# Patient Record
Sex: MALE | Race: WHITE | NOT HISPANIC OR LATINO | Employment: OTHER | URBAN - METROPOLITAN AREA
[De-identification: names, ages, dates, MRNs, and addresses within clinical notes are randomized per-mention and may not be internally consistent; named-entity substitution may affect disease eponyms.]

---

## 2017-01-07 ENCOUNTER — ALLSCRIPTS OFFICE VISIT (OUTPATIENT)
Dept: OTHER | Facility: OTHER | Age: 57
End: 2017-01-07

## 2017-01-11 ENCOUNTER — GENERIC CONVERSION - ENCOUNTER (OUTPATIENT)
Dept: OTHER | Facility: OTHER | Age: 57
End: 2017-01-11

## 2017-02-06 ENCOUNTER — ALLSCRIPTS OFFICE VISIT (OUTPATIENT)
Dept: OTHER | Facility: OTHER | Age: 57
End: 2017-02-06

## 2017-02-06 ENCOUNTER — GENERIC CONVERSION - ENCOUNTER (OUTPATIENT)
Dept: OTHER | Facility: OTHER | Age: 57
End: 2017-02-06

## 2017-08-03 ENCOUNTER — GENERIC CONVERSION - ENCOUNTER (OUTPATIENT)
Dept: OTHER | Facility: OTHER | Age: 57
End: 2017-08-03

## 2017-08-04 ENCOUNTER — GENERIC CONVERSION - ENCOUNTER (OUTPATIENT)
Dept: OTHER | Facility: OTHER | Age: 57
End: 2017-08-04

## 2017-08-07 ENCOUNTER — ALLSCRIPTS OFFICE VISIT (OUTPATIENT)
Dept: OTHER | Facility: OTHER | Age: 57
End: 2017-08-07

## 2017-08-15 ENCOUNTER — GENERIC CONVERSION - ENCOUNTER (OUTPATIENT)
Dept: OTHER | Facility: OTHER | Age: 57
End: 2017-08-15

## 2017-11-22 ENCOUNTER — HOSPITAL ENCOUNTER (OUTPATIENT)
Dept: NON INVASIVE DIAGNOSTICS | Facility: CLINIC | Age: 57
Discharge: HOME | End: 2017-11-22
Payer: COMMERCIAL

## 2017-11-22 DIAGNOSIS — I65.29 OCCLUSION AND STENOSIS OF UNSPECIFIED CAROTID ARTERY: ICD-10-CM

## 2017-11-22 PROCEDURE — 93880 EXTRACRANIAL BILAT STUDY: CPT

## 2017-12-12 ENCOUNTER — GENERIC CONVERSION - ENCOUNTER (OUTPATIENT)
Dept: OTHER | Facility: OTHER | Age: 57
End: 2017-12-12

## 2018-01-10 ENCOUNTER — ALLSCRIPTS OFFICE VISIT (OUTPATIENT)
Dept: OTHER | Facility: OTHER | Age: 58
End: 2018-01-10

## 2018-01-10 DIAGNOSIS — I65.23 OCCLUSION AND STENOSIS OF BILATERAL CAROTID ARTERIES: ICD-10-CM

## 2018-01-10 DIAGNOSIS — I10 ESSENTIAL (PRIMARY) HYPERTENSION: ICD-10-CM

## 2018-01-10 DIAGNOSIS — E11.65 TYPE 2 DIABETES MELLITUS WITH HYPERGLYCEMIA (HCC): ICD-10-CM

## 2018-01-10 DIAGNOSIS — Z12.5 ENCOUNTER FOR SCREENING FOR MALIGNANT NEOPLASM OF PROSTATE: ICD-10-CM

## 2018-01-10 DIAGNOSIS — E78.5 HYPERLIPIDEMIA: ICD-10-CM

## 2018-01-10 DIAGNOSIS — E78.00 PURE HYPERCHOLESTEROLEMIA: ICD-10-CM

## 2018-01-11 NOTE — RESULT NOTES
Verified Results  VAS CAROTID COMPLETE STUDY 21Nov2016 09:49AM Gamaliel Carver     Test Name Result Flag Reference   VAS CAROTID COMPLETE STUDY (Report)     [facility] REPORT   CLINICAL:   Indications: Carotid disease w/o CVA [I65 29]  6 month surveillance of carotid artery disease  Patient is asymptomatic at this time  Office visit pending: YES   Risk Factors   The patient has history of HTN, Diabetes (Yes) and hyperlipidemia  Clinical   Right Pressure: 146/90 mm Hg, Left Pressure: 152/90 mm Hg  FINDINGS:      Right    Impression PSV EDV (cm/s) Direction of Flow Ratio    Dist  ICA         90     30           1 22    Mid  ICA         69     22           0 94    Prox  ICA  50 - 69%  129     44           1 75    Dist CCA         63     15                 Mid CCA          74     15           1 00    Prox CCA         74     15           0 61    Ext Carotid        84     13           1 14    Prox Vert         33      9 Antegrade            Subclavian        66      5                 Innominate        120      0                    Left     Impression PSV EDV (cm/s) Direction of Flow Ratio    Dist  ICA        135     31           1 53    Mid  ICA         141     41           1 59    Prox  ICA  50 - 69%  143     45           1 62    Dist CCA         69     17                 Mid CCA          89     19           1 10    Prox CCA         80     19                 Ext Carotid       125     13           1 42    Prox Vert         46     14 Antegrade            Subclavian        114      0                          CONCLUSION:   Impression   Impression   RIGHT:   There is 50-69% stenosis noted in the internal carotid artery  Plaque is   heterogenous and irregular  Vertebral artery flow is antegrade  There is no significant subclavian artery   disease  LEFT:   There is 50-69% stenosis noted in the internal carotid artery  Plaque is   heterogenous and irregular  Vertebral artery flow is antegrade   There is no significant subclavian artery   disease  Compared to previous study on 5-16-16, there is no significant change  Recommend repeat testing in 6month as per protocol unless otherwise indicated  Internal carotid artery stenosis determination by consensus criteria from:   Nikki Ash et al  Carotid Artery Stenosis: Gray-Scale and Doppler US Diagnosis   - Society of Radiologists in [address] [place] [address], Radiology 2003;   [phone]        SIGNATURE:   Electronically Signed by: Matthew Johnson on 2016-11-21 07:05:56 PM

## 2018-01-12 VITALS
OXYGEN SATURATION: 98 % | DIASTOLIC BLOOD PRESSURE: 84 MMHG | SYSTOLIC BLOOD PRESSURE: 154 MMHG | WEIGHT: 203.31 LBS | BODY MASS INDEX: 30.11 KG/M2 | HEIGHT: 69 IN | HEART RATE: 70 BPM

## 2018-01-12 NOTE — PROGRESS NOTES
Assessment   1  Acute serous otitis media (381 01) (H65 00)    Plan   Acute serous otitis media    · Amoxicillin 500 MG Oral Capsule; TAKE 1 CAPSULE 3 TIMES DAILY UNTIL GONE    Discussion/Summary      Pt has bilateral otitis  po antibiotics ordered  pt may takae otc mucinex plain  f/u if perisists or worsens  Possible side effects of new medications were reviewed with the patient/guardian today  The treatment plan was reviewed with the patient/guardian  The patient/guardian understands and agrees with the treatment plan      Chief Complaint   non productive cough, head congestion, stuffy nose      History of Present Illness   HPI: pt presetsn with one day of head congestion a nd sinus pressure  no fever  pt seeing dr Pastora Clark for his diabetes  pt seeing cardiologist for cad  Review of Systems        Constitutional: feeling poorly-- and-- feeling tired, but-- no fever-- and-- no chills  ENT: nasal discharge, but-- no sore throat-- and-- no hoarseness  Cardiovascular: no chest pain  Respiratory: cough  Gastrointestinal: no vomiting-- and-- no diarrhea  Integumentary: no rashes  Neurological: no headache  Active Problems   1  Arteriosclerosis of carotid artery, bilateral (433 10,433 30) (I65 23)   2  Benign hypertension (401 1) (I10)   3  Carotid artery disease (447 9) (I77 9)   4  Coronary arteriosclerosis in native artery (414 01) (I25 10)   5  Encounter for prostate cancer screening (V76 44) (Z12 5)   6  Erectile dysfunction of non-organic origin (302 72) (F52 21)   7  Flu vaccine need (V04 81) (Z23)   8  Hypercholesterolemia (272 0) (E78 00)   9  Hyperlipidemia (272 4) (E78 5)   10  Hypertension (401 9) (I10)   11  Murmur (785 2) (R01 1)   12  Occlusion and stenosis of carotid artery (433 10) (I65 29)   13  Type 2 diabetes mellitus with hyperglycemia (250 00) (E11 65)    Past Medical History   1  History of Acute URI (465 9) (J06 9)   2   History of Cellulitis and abscess of face (682 0) (L03 211,L02 01)   3  History of Diabetic eye exam (V72 0,250 00) (E11 9,Z01 00)   4  History of Encounter for screening for malignant neoplasm of colon (V76 51) (Z12 11)   5  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)   6  History of chest pain (V13 89) (Z87 898)   7  History of crescendo angina (V12 59) (Z86 79)   8  History of headache (V13 89) (Z87 898)   9  History of pneumococcal vaccination (V49 89) (Z92 29)   10  History of visual disturbance (V12 49) (Z86 69)   11  History of Need for Tdap vaccination (V06 1) (Z23)   12  History of Prostate cancer screening (V76 44) (Z12 5)   13  History of Right eye injury, initial encounter (921 9) (S05 91XA)   14  History of S/P cardiac cath (V45 89) (Z98 890)   15  History of Skin tag (701 9) (L91 8)  Active Problems And Past Medical History Reviewed: The active problems and past medical history were reviewed and updated today  Family History   Mother    1  Family history of hepatic cirrhosis (V18 59) (Z83 79)   2  Family history of transient ischemic attacks (V17 1) (Z82 3)   3  Family history of Hepatitis  Father    4  Family history of myocardial infarction (V17 3) (Z82 49)  Grandparent    5  Family history of diabetes mellitus (V18 0) (Z83 3)   6  Family history of hepatic cirrhosis (V18 59) (Z83 79)   7  Family history of transient ischemic attacks (V17 1) (Z82 3)   8  Family history of Hepatitis    Social History    · Disabled   · Former smoker (H60 14) (Z33 315)   · QUIT 1/2008   · Household: Step daughter   · Household: Step son   ·    · Social alcohol use (Z78 9)    Surgical History   1  History of Colonoscopy (Fiberoptic) Screening   2  History of PTCA    Current Meds    1  Aspirin 325 MG Oral Tablet; TAKE 1 TABLET DAILY; Therapy: 79LFH0909 to Recorded   2  BD Insulin Syr Ultrafine II 31G X 5/16 0 5 ML Miscellaneous; USE AS DIRECTED; Therapy: 90Adc1254 to Recorded   3   Clopidogrel Bisulfate 75 MG Oral Tablet; TAKE 1 TABLET DAILY; Therapy: 06RVW4108 to (Jadyn Yu)  Requested for: 24Mar2017; Last     Rx:24Mar2017 Ordered   4  Fish Oil 1000 MG Oral Capsule; TAKE 1 CAPSULE Every twelve hours; Therapy: 87Iwy9780 to (Evaluate:48Yeo8619); Last Rx:21Apr2016 Ordered   5  FreeStyle Lancets Miscellaneous; TEST BLOOD SUGARS TWICE DAILY; Therapy: 59Ngt4168 to (Last Rx:21Apr2016) Ordered   6  FreeStyle Lite Test In Vitro Strip; TEST TWICE DAILY; Therapy: 56Fdn1399 to (Renew:18Oct2016) Recorded   7  GlipiZIDE 10 MG Oral Tablet; take 1 tablet by mouth twice a day; Therapy: 21Apr2016 to (Renew:18Oct2016)  Requested for: 21Apr2016; Last     Rx:21Apr2016 Ordered   8  HumuLIN N 100 UNIT/ML Subcutaneous Suspension; Take 8 Units q 9pm;     Therapy: 21Apr2016 to Recorded   9  MetFORMIN HCl  MG Oral Tablet Extended Release 24 Hour; TAKE 2 TABLETS     ONCE DAILY WITH THE EVENING MEAL; Therapy: 76OIB0117 to (Evaluate:39Vuq2122)  Requested for: 21Apr2016; Last     Rx:21Apr2016 Ordered   10  Metoprolol Tartrate 25 MG Oral Tablet; TAKE 2 TABLETS IN THE AM AND TAKE 1      TABLET IN THE PM;      Therapy: 76HME8895 to (Aditi Bar)  Requested for: 53Pmm5979; Last      Rx:47Cfl3796 Ordered   11  Ramipril 2 5 MG Oral Capsule; take 1 capsule twice a day; Therapy: 51DFK6362 to (Denny Lin)  Requested for: 13XOC2055 Ordered   12  Simvastatin 40 MG Oral Tablet; TAKE 1 TABLET DAILY AT BEDTIME (NEED TO CALL      AND MAKE APPOINTMENT FORADDITIONAL REFILLS); Therapy: 08CMG0980 to (Last Rx:13Nov2017)  Requested for: 67EJC6722 Ordered   13  Viagra 50 MG Oral Tablet; TAKE 1 TABLET DAILY 1 HOUR BEFORE NEEDED; Therapy: 24ZKR6660 to (Evaluate:10Oct2017)  Requested for: 64VAS9833; Last      Rx:04Oct2017 Ordered     The medication list was reviewed and updated today  Allergies   1  Allegra-D 12 Hour TB12   2  Coconut Flavor LIQD  3   Food    Vitals    Recorded: [id number] 01:39PM   Temperature 97 1 F, Tympanic   Heart Rate 97, L Brachial Artery   Pulse Quality Normal, L Brachial Artery   Respiration Quality Normal   Respiration 16   Systolic 415, LUE, Sitting   Diastolic 82, LUE, Sitting   Height 5 ft 9 in   Weight 201 lb 6 4 oz   BMI Calculated 29 74   BSA Calculated 2 07   O2 Saturation 97     Physical Exam        Constitutional      General appearance: No acute distress, well appearing and well nourished  Head and Face      Head and face: Normal        Palpation of the face and sinuses: No sinus tenderness  Eyes      Conjunctiva and lids: No erythema, swelling or discharge  Pupils and irises: Equal, round, reactive to light  Ears, Nose, Mouth, and Throat      External inspection of ears and nose: Normal        Otoscopic examination: Abnormal   Exam of the right middle ear showed a middle ear effusion  Exam of the left middle ear showed a middle ear effusion  Oropharynx: Normal with no erythema, edema, exudate or lesions  Pulmonary      Respiratory effort: No increased work of breathing or signs of respiratory distress  Auscultation of lungs: Clear to auscultation  Skin      Skin and subcutaneous tissue: Normal without rashes or lesions  Neurologic      Cranial nerves: Cranial nerves 2-12 intact  Psychiatric      Judgment and insight: Normal        Mood and affect: Normal        Results/Data   PHQ-9 Adult Depression Screening 28TCT3803 01:42PM User, Imagga      Test Name Result Flag Reference   PHQ-9 Adult Depression Score 0     Over the last two weeks, how often have you been bothered by any of the following problems?      Little interest or pleasure in doing things: Not at all - 0     Feeling down, depressed, or hopeless: Not at all - 0     Trouble falling or staying asleep, or sleeping too much: Not at all - 0     Feeling tired or having little energy: Not at all - 0     Poor appetite or over eating: Not at all - 0     Feeling bad about yourself - or that you are a failure or have let yourself or your family down: Not at all - 0     Trouble concentrating on things, such as reading the newspaper or watching television: Not at all - 0     Moving or speaking so slowly that other people could have noticed  Or the opposite -  being so fidgety or restless that you have been moving around a lot more than usual: Not at all - 0     Thoughts that you would be better off dead, or of hurting yourself in some way: Not at all - 0   PHQ-9 Adult Depression Screening Negative     PHQ-9 Difficulty Level Not difficult at all     PHQ-9 Severity No Depression        (1) HEMOGLOBIN A1C 04LFP3580 02:24PM       Test Name Result Flag Reference   HEMOGLOBIN A1C 7 5        Summary / No summary entered :        No summary entered  Documents attached :        sEndocrinology - ADIEL Yang; Enc: [id number] - Chart Update - - Yary Bridges)        (Additional Information Document)    Future Appointments      Date/Time Provider Specialty Site   02/05/2018 02:20 PM NIDA Barrera   Cardiology  [address]     Signatures    Electronically signed by : Elvan Schirmer, [facility]; Roger 10 2018  2:14PM EST                       (Author)     Electronically signed by : NIDA Benson ; Roger 10 2018  3:38PM EST                       (Author)

## 2018-01-14 VITALS
HEART RATE: 64 BPM | DIASTOLIC BLOOD PRESSURE: 82 MMHG | BODY MASS INDEX: 30.36 KG/M2 | WEIGHT: 205 LBS | SYSTOLIC BLOOD PRESSURE: 140 MMHG | HEIGHT: 69 IN

## 2018-01-15 VITALS
DIASTOLIC BLOOD PRESSURE: 82 MMHG | RESPIRATION RATE: 16 BRPM | WEIGHT: 200 LBS | HEART RATE: 76 BPM | SYSTOLIC BLOOD PRESSURE: 126 MMHG | TEMPERATURE: 96.3 F | BODY MASS INDEX: 29.62 KG/M2 | HEIGHT: 69 IN

## 2018-01-15 NOTE — RESULT NOTES
Verified Results  (1) HEMOGLOBIN A1C 09Trl4991 12:00AM Niki Bhardwaj     Test Name Result Flag Reference   HEMOGLOBIN A1C 7 4 A    EST  AVG   GLUCOSE 166 A       Summary / No summary entered :      No summary entered   Documents attached :      [address] Work - ADIEL Yang; Enc: [id number] - CDA - - (Unassigned)      (Additional Information Document)

## 2018-01-22 VITALS
HEIGHT: 69 IN | SYSTOLIC BLOOD PRESSURE: 130 MMHG | RESPIRATION RATE: 16 BRPM | WEIGHT: 201.4 LBS | HEART RATE: 97 BPM | TEMPERATURE: 97.1 F | OXYGEN SATURATION: 97 % | BODY MASS INDEX: 29.83 KG/M2 | DIASTOLIC BLOOD PRESSURE: 82 MMHG

## 2018-03-21 ENCOUNTER — TELEPHONE (OUTPATIENT)
Dept: CARDIOLOGY CLINIC | Facility: CLINIC | Age: 58
End: 2018-03-21

## 2018-03-21 DIAGNOSIS — Z79.02 ENCOUNTER FOR MONITORING ANTIPLATELET THERAPY: Primary | ICD-10-CM

## 2018-03-21 DIAGNOSIS — Z51.81 ENCOUNTER FOR MONITORING ANTIPLATELET THERAPY: Primary | ICD-10-CM

## 2018-03-21 RX ORDER — CLOPIDOGREL BISULFATE 75 MG/1
75 TABLET ORAL DAILY
Qty: 30 TABLET | Refills: 0 | Status: SHIPPED | OUTPATIENT
Start: 2018-03-21 | End: 2018-04-06

## 2018-03-21 NOTE — TELEPHONE ENCOUNTER
Refill sent to pharmacy    Patient has cancelled 4 appointments to establish with kassy or spike since february  Currently he is scheduled on 4/4 with 6  ONLY 1 month pending

## 2018-03-28 ENCOUNTER — TELEPHONE (OUTPATIENT)
Dept: CARDIOLOGY CLINIC | Facility: CLINIC | Age: 58
End: 2018-03-28

## 2018-03-28 DIAGNOSIS — I10 HYPERTENSION, ESSENTIAL: Primary | ICD-10-CM

## 2018-03-28 RX ORDER — RAMIPRIL 2.5 MG/1
2.5 CAPSULE ORAL 2 TIMES DAILY
Qty: 90 CAPSULE | Refills: 0 | Status: SHIPPED | OUTPATIENT
Start: 2018-03-28 | End: 2018-04-02 | Stop reason: SDUPTHER

## 2018-03-28 NOTE — TELEPHONE ENCOUNTER
Is patient scheduled for a follow-up? Per last refill patient needed to schedule an appointment - he was scheduled on 4/4 with se6, but I do not see him on the schedule now  Thanks

## 2018-03-28 NOTE — TELEPHONE ENCOUNTER
Patient called & needs refill of Metoprolol 25mg, Ramipril 2 5mg   [facility]

## 2018-03-30 NOTE — TELEPHONE ENCOUNTER
PT CALLED AGAIN & NOW IS COMPLETELY OUT OF HIS MEDS & WOULD LIKE A MONTH SUPPLY SENT TO HIS LOCAL PHARM [facility] IN [place] & THEN A REG REFILL [facility]ROUGH [facility]

## 2018-03-30 NOTE — TELEPHONE ENCOUNTER
metoprolol tartrate (LOPRESSOR) 25 mg tablet        Sig: Take 2 tablets in the AM and 1 in the PM        Class: Normal        Date/Time Signed: 3/28/2018  4:15 PM       E-Prescribing Status: Receipt confirmed by pharmacy (3/28/2018  4:17 PM EDT)          ramipril (ALTACE) 2 5 mg capsule        Sig: Take 1 capsule (2 5 mg total) by mouth 2 (two) times a day        Class: Normal        Route: Oral        Date/Time Signed: 3/28/2018  4:15 PM       E-Prescribing Status: Receipt confirmed by pharmacy (3/28/2018  4:17 PM EDT)          Medications were already sent to mail order  1 month supply pending

## 2018-03-30 NOTE — TELEPHONE ENCOUNTER
Patient needs 1 month of meds sent to local pharmacy  Refills were already sent to mail order    Refill for local pharm pending

## 2018-04-02 DIAGNOSIS — I10 HYPERTENSION, ESSENTIAL: ICD-10-CM

## 2018-04-02 RX ORDER — RAMIPRIL 2.5 MG/1
2.5 CAPSULE ORAL 2 TIMES DAILY
Qty: 30 CAPSULE | Refills: 0 | Status: SHIPPED | OUTPATIENT
Start: 2018-04-02 | End: 2018-04-06 | Stop reason: SDUPTHER

## 2018-04-02 NOTE — TELEPHONE ENCOUNTER
PLEASE SEND REFILLS ON METOPROLOL AND RAMIPRIL TO [facility] IN [place]   PT HAS NO MEDS LEFT

## 2018-04-05 RX ORDER — GLIPIZIDE 10 MG/1
10 TABLET ORAL
COMMUNITY

## 2018-04-05 RX ORDER — SIMVASTATIN 40 MG
40 TABLET ORAL
COMMUNITY
End: 2018-11-27 | Stop reason: SDUPTHER

## 2018-04-05 RX ORDER — ASPIRIN 325 MG
325 TABLET ORAL DAILY
COMMUNITY

## 2018-04-06 ENCOUNTER — OFFICE VISIT (OUTPATIENT)
Dept: CARDIOLOGY CLINIC | Facility: CLINIC | Age: 58
End: 2018-04-06
Payer: COMMERCIAL

## 2018-04-06 VITALS
SYSTOLIC BLOOD PRESSURE: 124 MMHG | DIASTOLIC BLOOD PRESSURE: 72 MMHG | HEART RATE: 71 BPM | WEIGHT: 201 LBS | BODY MASS INDEX: 29.77 KG/M2 | OXYGEN SATURATION: 97 % | HEIGHT: 69 IN

## 2018-04-06 DIAGNOSIS — I10 HYPERTENSION, ESSENTIAL: ICD-10-CM

## 2018-04-06 DIAGNOSIS — E78.2 MIXED HYPERLIPIDEMIA: ICD-10-CM

## 2018-04-06 DIAGNOSIS — I25.10 CAD S/P PERCUTANEOUS CORONARY ANGIOPLASTY: Primary | ICD-10-CM

## 2018-04-06 DIAGNOSIS — I65.23 ARTERIOSCLEROSIS OF CAROTID ARTERY, BILATERAL: ICD-10-CM

## 2018-04-06 DIAGNOSIS — Z98.61 CAD S/P PERCUTANEOUS CORONARY ANGIOPLASTY: Primary | ICD-10-CM

## 2018-04-06 PROCEDURE — 4010F ACE/ARB THERAPY RXD/TAKEN: CPT | Performed by: INTERNAL MEDICINE

## 2018-04-06 PROCEDURE — 99214 OFFICE O/P EST MOD 30 MIN: CPT | Performed by: INTERNAL MEDICINE

## 2018-04-06 RX ORDER — RAMIPRIL 2.5 MG/1
2.5 CAPSULE ORAL 2 TIMES DAILY
Qty: 180 CAPSULE | Refills: 0 | Status: SHIPPED | OUTPATIENT
Start: 2018-04-06

## 2018-04-06 RX ORDER — RAMIPRIL 2.5 MG/1
2.5 CAPSULE ORAL 2 TIMES DAILY
Qty: 180 CAPSULE | Refills: 2 | Status: SHIPPED | OUTPATIENT
Start: 2018-04-06 | End: 2018-04-06 | Stop reason: SDUPTHER

## 2018-04-06 NOTE — PROGRESS NOTES
CARDIOLOGY OFFICE VISIT  [facility]  [address], [address] [place] [address], [facility], [address] [place], [place] [address]hayden aZzueta  Tel: [phone]      NAME: Eden Ramirez  AGE: 65 y o  SEX: male  : 1960   MRN: [id number]      Chief Complaint:  Chief Complaint   Patient presents with    Hypertension    Hyperlipidemia    Coronary Artery Disease         History of Present Illness:   Patient comes for follow up  States he is doing well from cardiac stand point and denies chest pain / pressure, SOB, palpitations, lightheadedness, syncope, swelling feet, orthopnea, PND, claudication  CAD s/p MI and stenting of RCA in 2008 -  States is doing well cardiac wise with no cardiac symptoms  Taking all medications regularly including  Aspirin, Plavix, metoprolol tartrate, ramipril, simvastatin  HTN -  Has been hypertensive for many years  Taking medications regularly  Denies lightheadedness, headache, medication side effects  HLP -  Has had hyperlipidemia for many years  Taking statin regularly along with diet control  Denies myalgia  PCP closely monitoring the blood work  Bilateral carotid arteriosclerosis - Carotid duplex study (2017) - B/L 50-69% prox ICA stenosis    Denies symptoms suggestive of TIA, CVA, amaurosis      Past Medical History:  Past Medical History:   Diagnosis Date    Coronary artery disease     Diabetes mellitus ([address] )     HTN (hypertension)     Hyperlipidemia     Murmur          Past Surgical History:  PCI      Family History:  Family History   Problem Relation Age of Onset    Heart attack Father          Social History:  Social History     Social History    Marital status: Single     Spouse name: N/A    Number of children: N/A    Years of education: N/A     Social History Main Topics    Smoking status: Former Smoker    Smokeless tobacco: Not on file    Alcohol use Yes    Drug use: Unknown    Sexual activity: Not on file     Other Topics Concern    Not on file     Social History Narrative    No narrative on file       The following portions of the patient's history were reviewed and updated as appropriate: past medical history, past surgical history, past family history,  past social history, current medications, allergies and problem list       Review of Systems:  Constitutional: Denies fever, chills, fatigue  Eyes: Denies eye redness, eye discharge, double vision  ENT: Denies hearing loss, tinnitus, sneezing, nasal discharge, sore throat   Respiratory: Denies cough, expectoration, hemoptysis, shortness of breath  Cardiovascular: Denies chest pain, palpitations, orthopnea, PND, lower extremity swelling  Gastrointestinal: Denies abdominal pain, nausea, vomiting, hematemesis, diarrhea, bloody stools  Genito-Urinary: Denies dysuria, incontinence  Musculoskeletal: Denies back pain, joint pain, muscle pain  Neurologic: Denies confusion, lightheadedness, syncope, headache, focal weakness, sensory changes, seizures  Endocrine: Denies polyuria, polydipsia, temperature intolerance  Allergy and Immunology: Denies hives, insect bite sensitivity  Hematological and Lymphatic: Denies bleeding problems, swollen glands   Psychological: Denies depression, suicidal ideation, anxiety, panic  Dermatological: Denies pruritus, rash, skin lesion changes      Vitals:  Vitals:    04/06/18 1314   BP: 124/72   Pulse: 71   SpO2: 97%       Body mass index is 29 68 kg/m²  Weight (last 2 days)     Date/Time   Weight    04/06/18 1314  91 2 (201)                Physical Examination:  General: Patient is not in acute distress  Awake, alert, oriented in time, place and person  Responding to commands  Head: Normocephalic  Atraumatic  Eyes: Both pupils normal sized, round and reactive to light  Nonicteric  ENT: Normal external ear canals  Nares normal, no drainage  Lips and oral mucosa normal  Neck: Supple  JVP not raised   Trachea central  No thyromegaly  Lungs: Bilateral bronchovascular breath sounds with no crackles or rhonchi  Chest wall: No tenderness  Cardiovascular: RRR  S1 and S2 normal  No murmur, rub or gallop  Gastrointestinal: Abdomen soft, nontender  No guarding or rigidity  Liver and spleen not palpable  Bowel sounds present  Neurologic: Patient is awake, alert, oriented in time, place and person  Responding to command  Moving all extremities  Integumentary:  No skin rash  Lymphatic: No cervical lymphadenopathy  Back: Symmetric  No CVA tenderness  Extremities: No clubbing, cyanosis or edema      Medications:    Current Outpatient Prescriptions:     aspirin 325 mg tablet, Take 325 mg by mouth daily, Disp: , Rfl:     glipiZIDE (GLUCOTROL) 10 mg tablet, Take 10 mg by mouth 2 (two) times a day before meals, Disp: , Rfl:     insulin NPH (HUMULIN N) 100 Units/mL subcutaneous injection, Inject under the skin, Disp: , Rfl:     metFORMIN (GLUCOPHAGE) 500 mg tablet, Take 500 mg by mouth 2 (two) times a day with meals, Disp: , Rfl:     metoprolol tartrate (LOPRESSOR) 25 mg tablet, Take 1 tablet (25 mg total) by mouth every 12 (twelve) hours, Disp: 180 tablet, Rfl: 0    ramipril (ALTACE) 2 5 mg capsule, Take 1 capsule (2 5 mg total) by mouth 2 (two) times a day, Disp: 180 capsule, Rfl: 0    simvastatin (ZOCOR) 40 mg tablet, Take 40 mg by mouth daily at bedtime, Disp: , Rfl:       Allergies: Allergies   Allergen Reactions    Allegra Allergy [Fexofenadine]          Assessment and Plan:  1  CAD S/P percutaneous coronary angioplasty   stable  Continue aspirin, beta-blocker, Ace inhibitor, statin  Discontinue Plavix    2  Essential hypertension   BP stable  Continue beta-blocker, Ace inhibitor    3  Mixed hyperlipidemia   continue statin  And diet control  His PCP closely monitors his blood work    4  Arteriosclerosis of carotid artery, bilateral   continue aspirin, statin    Yearly follow-up studies    Recommend aggressive risk factor modification and therapeutic lifestyle changes  Low-salt, low-calorie, low-fat, low-cholesterol diet with regular exercise and to optimize weight  I will defer the ordering and monitoring of necessity lab studies to you, but I am available and happy to review and manage any of the data at your request in the future  Discussed concepts of atherosclerosis, including signs and symptoms of cardiac disease  Previous studies were reviewed  Safety measures were reviewed  Questions were entertained and answered  Patient was advised to report any problems requiring medical attention  Follow-up with PCP and appropriate specialist and lab work as discussed  Return for follow up visit as scheduled or earlier, if needed  Thank you for allowing me to participate in the care and evaluation of your patient  Should you have any questions, please feel free to contact me        Avni Kwong MD  4/6/2018,1:38 PM

## 2018-04-09 RX ORDER — RAMIPRIL 2.5 MG/1
2.5 CAPSULE ORAL 2 TIMES DAILY
Qty: 60 CAPSULE | Refills: 0 | Status: SHIPPED | OUTPATIENT
Start: 2018-04-09 | End: 2018-04-17 | Stop reason: SDUPTHER

## 2018-04-17 RX ORDER — RAMIPRIL 2.5 MG/1
2.5 CAPSULE ORAL 2 TIMES DAILY
Qty: 180 CAPSULE | Refills: 3 | Status: SHIPPED | OUTPATIENT
Start: 2018-04-17 | End: 2018-08-02 | Stop reason: SDUPTHER

## 2018-04-17 NOTE — TELEPHONE ENCOUNTER
PT SAID HIS [facility] ORDER IS [facility] NOT [facility] AND HE NEEDS HIS MEDS SENT TO HIS MAIL ORDER  PLEASE MAKE SURE THAT THE MEDS WENT TO [facility]   Rocio Stubbs

## 2018-06-08 ENCOUNTER — TELEPHONE (OUTPATIENT)
Dept: CARDIOLOGY CLINIC | Facility: CLINIC | Age: 58
End: 2018-06-08

## 2018-06-08 NOTE — TELEPHONE ENCOUNTER
Spoke with [facility] who wanted a new script for Metoprolol Tartrate 50 mg, 2 tablets in the am, 1 tablet in the pm, #270 with 3 refills

## 2018-08-02 ENCOUNTER — OFFICE VISIT (OUTPATIENT)
Dept: UROLOGY | Facility: CLINIC | Age: 58
End: 2018-08-02
Payer: COMMERCIAL

## 2018-08-02 VITALS
DIASTOLIC BLOOD PRESSURE: 70 MMHG | HEIGHT: 69 IN | SYSTOLIC BLOOD PRESSURE: 126 MMHG | WEIGHT: 194 LBS | BODY MASS INDEX: 28.73 KG/M2

## 2018-08-02 DIAGNOSIS — N52.9 ERECTILE DYSFUNCTION, UNSPECIFIED ERECTILE DYSFUNCTION TYPE: Primary | ICD-10-CM

## 2018-08-02 DIAGNOSIS — N40.0 BENIGN PROSTATIC HYPERPLASIA WITHOUT LOWER URINARY TRACT SYMPTOMS: ICD-10-CM

## 2018-08-02 PROCEDURE — 81002 URINALYSIS NONAUTO W/O SCOPE: CPT | Performed by: PHYSICIAN ASSISTANT

## 2018-08-02 PROCEDURE — 99203 OFFICE O/P NEW LOW 30 MIN: CPT | Performed by: PHYSICIAN ASSISTANT

## 2018-08-02 RX ORDER — LANCETS 28 GAUGE
EACH MISCELLANEOUS 2 TIMES DAILY
COMMUNITY
Start: 2016-04-21

## 2018-08-02 RX ORDER — CHLORAL HYDRATE 500 MG
1 CAPSULE ORAL EVERY 12 HOURS
COMMUNITY
Start: 2016-04-21

## 2018-08-02 NOTE — PROGRESS NOTES
UROLOGY NEW PATIENT NOTE     Patient Identifiers: Sandi Oneil (MRN: [id number])    Service Providing Consultation:  Urology, Mily Liriano PA-C  Consults  Date of Service: 8/2/2018        History of Present Illness:     Sandi Oneil is a 65 y o  old with a history of  diabetes and chronic low back pain presents for evaluation of erectile dysfunction  He says he has not had a good erection in about 8 years  He uses insulin and his hemoglobin A1c is around 7  He is a nonsmoker and denies any sleep apnea  He states when he does get a partial erection it is curved  Urine is clear  AUA index score is 1  There is no recent PSA  He denies any prior urologic issues  He gets pain around his groin and rectum as well as leg numbness due to his back issues  Past Medical, Past Surgical History:     Past Medical History:   Diagnosis Date    Coronary artery disease     Diabetes mellitus ([facility] [address] )     HTN (hypertension)     Hyperlipidemia     Murmur    :    History reviewed   No pertinent surgical history :    Medications, Allergies:     Current Outpatient Prescriptions:     aspirin 325 mg tablet, Take 325 mg by mouth daily, Disp: , Rfl:     glipiZIDE (GLUCOTROL) 10 mg tablet, Take 10 mg by mouth 2 (two) times a day before meals, Disp: , Rfl:     insulin NPH (HUMULIN N) 100 Units/mL subcutaneous injection, Inject under the skin, Disp: , Rfl:     Lancets (FREESTYLE) lancets, by Does not apply route 2 (two) times a day, Disp: , Rfl:     metFORMIN (GLUCOPHAGE) 500 mg tablet, Take 500 mg by mouth 2 (two) times a day with meals, Disp: , Rfl:     metoprolol tartrate (LOPRESSOR) 25 mg tablet, Take 1 tablet (25 mg total) by mouth every 12 (twelve) hours, Disp: 180 tablet, Rfl: 0    Omega-3 Fatty Acids (FISH OIL) 1,000 mg, Take 1 capsule by mouth every 12 (twelve) hours, Disp: , Rfl:     ramipril (ALTACE) 2 5 mg capsule, Take 1 capsule (2 5 mg total) by mouth 2 (two) times a day, Disp: 180 capsule, Rfl: 0   simvastatin (ZOCOR) 40 mg tablet, Take 40 mg by mouth daily at bedtime, Disp: , Rfl:     Allergies: Allergies   Allergen Reactions    Allegra Allergy [Fexofenadine]     Coconut Oil     Fexofenadine-Pseudoephed Er     Food      Annotation - [id number]: COCONUT   :    Social and Family History:   Social History:   Social History   Substance Use Topics    Smoking status: Former Smoker    Smokeless tobacco: Never Used    Alcohol use Yes     History   Smoking Status    Former Smoker   Smokeless Tobacco    Never Used       Family History:  Family History   Problem Relation Age of Onset    Heart attack Father    :     Review of Systems:     General: Fever, chills, or night sweats: negative  Cardiac: Negative for chest pain  Pulmonary: Negative for shortness of breath  Gastrointestinal: Abdominal pain negative  Nausea, vomiting, or diarrhea negative,  Genitourinary: See HPI above  Patient does not have hematuria  All other systems queried were negative  Physical Exam:   General: Patient is pleasant and in NAD  Awake and alert  /70 (BP Location: Left arm, Patient Position: Sitting, Cuff Size: Adult)   Ht 5' 9" (1 753 m)   Wt 88 kg (194 lb)   BMI 28 65 kg/m²   Cardiac: Peripheral edema: negative  Pulmonary: Non-labored breathing  Abdomen: Soft, non-tender, non-distended  No surgical scars  No masses, tenderness, hernias noted  Genitourinary: Negative CVA tenderness, negative suprapubic tenderness  (Male): Penis circumcised, anterior calcified plaque, meatus patent  Testicles descended into scrotum  and atrophic bilaterally  No inguinal hernias bilaterally  JU: Prostate is not enlarged at 28 grams  The prostate is not boggy  The prostate is not tender  No nodules noted        Labs:   No results found for: HGB, HCT, WBC, PLT]    No results found for: NA, K, CL, CO2, BUN, CREATININE, CALCIUM, GLUCOSE]    Imaging:   I personally reviewed the images and report of the following studies, and reviewed them with the patient:   none    ASSESSMENT:      1  Erectile dysfunction   2  BPH   3  Peyronie's disease    PLAN:   - options of management reviewed  Will check hormone levels  He has tried medication in the past unsuccessfully and is also too costly  We discussed vacuum pump intracorporeal alprostadil as well as inflatable penile prosthesis  Information was given on all for his review  -  PSA prior to visit  -  With diabetes hypertension back problems and Peyronie's disease and inflatable prosthesis may be his best bet  Thank you for allowing me to participate in this patients care  Please do not hesitate to call with any additional questions    Afia Reddy PA-C

## 2018-08-14 LAB
LEFT EYE DIABETIC RETINOPATHY: NORMAL
RIGHT EYE DIABETIC RETINOPATHY: NORMAL

## 2018-08-14 PROCEDURE — 3072F LOW RISK FOR RETINOPATHY: CPT | Performed by: INTERNAL MEDICINE

## 2018-08-28 ENCOUNTER — OFFICE VISIT (OUTPATIENT)
Dept: FAMILY MEDICINE CLINIC | Facility: CLINIC | Age: 58
End: 2018-08-28
Payer: COMMERCIAL

## 2018-08-28 VITALS
HEART RATE: 70 BPM | HEIGHT: 69 IN | DIASTOLIC BLOOD PRESSURE: 76 MMHG | BODY MASS INDEX: 29.03 KG/M2 | RESPIRATION RATE: 16 BRPM | TEMPERATURE: 98.5 F | WEIGHT: 196 LBS | SYSTOLIC BLOOD PRESSURE: 138 MMHG | OXYGEN SATURATION: 97 %

## 2018-08-28 DIAGNOSIS — I65.23 ARTERIOSCLEROSIS OF CAROTID ARTERY, BILATERAL: ICD-10-CM

## 2018-08-28 DIAGNOSIS — I10 HYPERTENSION, ESSENTIAL: ICD-10-CM

## 2018-08-28 DIAGNOSIS — E11.65 TYPE 2 DIABETES MELLITUS WITH HYPERGLYCEMIA, WITH LONG-TERM CURRENT USE OF INSULIN (HCC): ICD-10-CM

## 2018-08-28 DIAGNOSIS — I25.10 CAD S/P PERCUTANEOUS CORONARY ANGIOPLASTY: ICD-10-CM

## 2018-08-28 DIAGNOSIS — E78.2 MIXED HYPERLIPIDEMIA: ICD-10-CM

## 2018-08-28 DIAGNOSIS — Z79.4 TYPE 2 DIABETES MELLITUS WITH HYPERGLYCEMIA, WITH LONG-TERM CURRENT USE OF INSULIN (HCC): ICD-10-CM

## 2018-08-28 DIAGNOSIS — Z01.818 PRE-OP EXAM: Primary | ICD-10-CM

## 2018-08-28 DIAGNOSIS — Z98.61 CAD S/P PERCUTANEOUS CORONARY ANGIOPLASTY: ICD-10-CM

## 2018-08-28 PROCEDURE — 3075F SYST BP GE 130 - 139MM HG: CPT | Performed by: INTERNAL MEDICINE

## 2018-08-28 PROCEDURE — 99203 OFFICE O/P NEW LOW 30 MIN: CPT | Performed by: INTERNAL MEDICINE

## 2018-08-28 PROCEDURE — 3078F DIAST BP <80 MM HG: CPT | Performed by: INTERNAL MEDICINE

## 2018-08-28 PROCEDURE — 93000 ELECTROCARDIOGRAM COMPLETE: CPT | Performed by: INTERNAL MEDICINE

## 2018-08-28 NOTE — PROGRESS NOTES
Assessment/Plan:    Patient Instructions   No aspirin, nsaid, ginkoba, gingsing, vit e or fish oil x 1 week before surgery  Told him no glucotrol, metformen,day of surgery  Take only 8 units nph insulin in am   Take metoprolol and ramipril day of surgery  Diagnoses and all orders for this visit:    Pre-op exam  -     POCT ECG    Other orders  -     Cancel: POCT hemoglobin A1c  -     Cancel: PSA, total and free; Future  -     Cancel: Microalbumin / creatinine urine ratio  -     Cancel: Ambulatory referral to Colorectal Surgery; Future          Subjective:      Patient ID: Ranjan Benitez is a 65 y o  male  +mi jan 2008, +htn which is controlled  +dm last a1c was 7 0 - 3 weeks ago, denies arrythmia or problem with gen anesthsia  Surgery 9/6/18 os dr Geno Fall        The following portions of the patient's history were reviewed and updated as appropriate:   He  has a past medical history of Coronary artery disease; Diabetes mellitus ([address] ); HTN (hypertension); Hyperlipidemia; and Murmur  He   Patient Active Problem List    Diagnosis Date Noted    CAD S/P percutaneous coronary angioplasty 04/06/2018    Hypertension, essential 04/06/2018    Mixed hyperlipidemia 04/06/2018    Arteriosclerosis of carotid artery, bilateral 04/06/2018     He  has no past surgical history on file  His family history includes Heart attack in his father  He  reports that he has quit smoking  He has never used smokeless tobacco  He reports that he drinks alcohol  He reports that he uses drugs, including Marijuana, about 2 times per week    Current Outpatient Prescriptions   Medication Sig Dispense Refill    aspirin 325 mg tablet Take 325 mg by mouth daily      glipiZIDE (GLUCOTROL) 10 mg tablet Take 10 mg by mouth 2 (two) times a day before meals      insulin NPH (HUMULIN N) 100 Units/mL subcutaneous injection Inject under the skin 26 units in am and 28 units pm       Lancets (FREESTYLE) lancets by Does not apply route 2 (two) times a day      metFORMIN (GLUCOPHAGE) 500 mg tablet Take 500 mg by mouth 2 (two) times a day with meals      metoprolol tartrate (LOPRESSOR) 25 mg tablet Take 1 tablet (25 mg total) by mouth every 12 (twelve) hours 180 tablet 0    Omega-3 Fatty Acids (FISH OIL) 1,000 mg Take 1 capsule by mouth every 12 (twelve) hours      ramipril (ALTACE) 2 5 mg capsule Take 1 capsule (2 5 mg total) by mouth 2 (two) times a day 180 capsule 0    simvastatin (ZOCOR) 40 mg tablet Take 40 mg by mouth daily at bedtime       No current facility-administered medications for this visit  Current Outpatient Prescriptions on File Prior to Visit   Medication Sig    aspirin 325 mg tablet Take 325 mg by mouth daily    glipiZIDE (GLUCOTROL) 10 mg tablet Take 10 mg by mouth 2 (two) times a day before meals    insulin NPH (HUMULIN N) 100 Units/mL subcutaneous injection Inject under the skin 26 units in am and 28 units pm     Lancets (FREESTYLE) lancets by Does not apply route 2 (two) times a day    metFORMIN (GLUCOPHAGE) 500 mg tablet Take 500 mg by mouth 2 (two) times a day with meals    metoprolol tartrate (LOPRESSOR) 25 mg tablet Take 1 tablet (25 mg total) by mouth every 12 (twelve) hours    Omega-3 Fatty Acids (FISH OIL) 1,000 mg Take 1 capsule by mouth every 12 (twelve) hours    ramipril (ALTACE) 2 5 mg capsule Take 1 capsule (2 5 mg total) by mouth 2 (two) times a day    simvastatin (ZOCOR) 40 mg tablet Take 40 mg by mouth daily at bedtime     No current facility-administered medications on file prior to visit  He is allergic to allegra allergy [fexofenadine]; coconut oil; fexofenadine-pseudoephed er; and food       Review of Systems   Constitutional: Negative  HENT: Negative  Eyes: Positive for visual disturbance  Respiratory: Negative  Cardiovascular: Negative            Objective:      /76 (BP Location: Left arm, Patient Position: Sitting, Cuff Size: Standard)   Pulse 70   Temp 98 5 °F (36 9 °C) (Tympanic)   Resp 16   Ht 5' 9" (1 753 m)   Wt 88 9 kg (196 lb)   SpO2 97%   BMI 28 94 kg/m²          Physical Exam   Constitutional: He appears well-developed and well-nourished  HENT:   Head: Normocephalic and atraumatic  Right Ear: External ear normal    Left Ear: External ear normal    Nose: Nose normal    Neck: Normal range of motion  Neck supple  Cardiovascular: Normal rate, regular rhythm and normal heart sounds  Pulses:       Dorsalis pedis pulses are 2+ on the right side, and 2+ on the left side  Pulmonary/Chest: Effort normal and breath sounds normal    Abdominal: Soft  Bowel sounds are normal    Feet:   Right Foot:   Skin Integrity: Positive for warmth  Negative for erythema  Left Foot:   Skin Integrity: Positive for warmth  Negative for erythema  Patient's shoes and socks removed  Right Foot/Ankle   Right Foot Inspection  Skin Exam: warmth no erythema                            Sensory   Vibration: intact    Monofilament testing: intact  Vascular    The right DP pulse is 2+  Left Foot/Ankle  Left Foot Inspection  Skin Exam: warmthno erythema                                         Sensory   Vibration: intact    Monofilament: intact  Vascular    The left DP pulse is 2+  Assign Risk Category:  No deformity present;  No loss of protective sensation;        Risk: 0

## 2018-08-28 NOTE — PATIENT INSTRUCTIONS
No aspirin, nsaid, ginkoba, gingsing, vit e or fish oil x 1 week before surgery  Told him no glucotrol, metformen,day of surgery  Take only 8 units nph insulin in am   Take metoprolol and ramipril day of surgery  Reviewed ekg - okay    He did not have lab yet

## 2018-09-21 ENCOUNTER — OFFICE VISIT (OUTPATIENT)
Dept: FAMILY MEDICINE CLINIC | Facility: CLINIC | Age: 58
End: 2018-09-21
Payer: COMMERCIAL

## 2018-09-21 VITALS
TEMPERATURE: 98.1 F | OXYGEN SATURATION: 96 % | BODY MASS INDEX: 29.47 KG/M2 | RESPIRATION RATE: 16 BRPM | HEART RATE: 68 BPM | SYSTOLIC BLOOD PRESSURE: 138 MMHG | HEIGHT: 69 IN | WEIGHT: 199 LBS | DIASTOLIC BLOOD PRESSURE: 70 MMHG

## 2018-09-21 DIAGNOSIS — Z12.11 SCREEN FOR COLON CANCER: Primary | ICD-10-CM

## 2018-09-21 DIAGNOSIS — J06.9 UPPER RESPIRATORY TRACT INFECTION, UNSPECIFIED TYPE: ICD-10-CM

## 2018-09-21 PROCEDURE — 3008F BODY MASS INDEX DOCD: CPT | Performed by: INTERNAL MEDICINE

## 2018-09-21 PROCEDURE — 1036F TOBACCO NON-USER: CPT | Performed by: INTERNAL MEDICINE

## 2018-09-21 PROCEDURE — 99213 OFFICE O/P EST LOW 20 MIN: CPT | Performed by: INTERNAL MEDICINE

## 2018-09-21 RX ORDER — AZITHROMYCIN 250 MG/1
TABLET, FILM COATED ORAL
Qty: 6 TABLET | Refills: 0 | Status: SHIPPED | OUTPATIENT
Start: 2018-09-21 | End: 2018-09-25

## 2018-09-21 RX ORDER — HUMAN INSULIN 100 [IU]/ML
INJECTION, SUSPENSION SUBCUTANEOUS
COMMUNITY
Start: 2018-08-09

## 2018-09-21 RX ORDER — NEOMYCIN SULFATE, POLYMYXIN B SULFATE AND DEXAMETHASONE 3.5; 10000; 1 MG/ML; [USP'U]/ML; MG/ML
SUSPENSION/ DROPS OPHTHALMIC
COMMUNITY
Start: 2018-08-31

## 2018-09-21 NOTE — PROGRESS NOTES
Assessment/Plan:         Diagnoses and all orders for this visit:    Screen for colon cancer  -     Ambulatory referral to Colorectal Surgery; Future    Other orders  -     neomycin-polymyxin-dexamethasone (MAXITROL) ophthalmic suspension;   -     NOVOLIN 70/30 RELION (70-30) 100 UNIT/ML subcutaneous injection;           Subjective:      Patient ID: Lilyan Cranker is a 65 y o  male  Pt feels stuffed up and +dry cough  -sore throat  He was around sick people at work  +h/a denies f/s/c +ill x 2 days +hoarse        The following portions of the patient's history were reviewed and updated as appropriate:   He  has a past medical history of Chest pain; Coronary artery disease; Crescendo angina ([facility] [address] ); Diabetes mellitus ([facility] [address] ); Healed myocardial infarct; HTN (hypertension); Hyperlipidemia; Murmur; and Visual disturbance  He   Patient Active Problem List    Diagnosis Date Noted    CAD S/P percutaneous coronary angioplasty 04/06/2018    Hypertension, essential 04/06/2018    Mixed hyperlipidemia 04/06/2018    Arteriosclerosis of carotid artery, bilateral 04/06/2018    Type 2 diabetes mellitus with hyperglycemia ([facility] [address] ) 02/10/2014     He  has a past surgical history that includes Knee surgery; Colonoscopy; and Coronary angioplasty  His family history includes Cirrhosis in his mother and other; Diabetes in his other; Heart attack in his father; Hepatitis in his mother and other; Transient ischemic attack in his mother and other  He  reports that he quit smoking about 10 years ago  He has never used smokeless tobacco  He reports that he drinks alcohol  He reports that he uses drugs, including Marijuana, about 2 times per week    Current Outpatient Prescriptions   Medication Sig Dispense Refill    aspirin 325 mg tablet Take 325 mg by mouth daily      glipiZIDE (GLUCOTROL) 10 mg tablet Take 10 mg by mouth 2 (two) times a day before meals      insulin NPH (HUMULIN N) 100 Units/mL subcutaneous injection Inject under the skin 26 units in am and 28 units pm       Lancets (FREESTYLE) lancets by Does not apply route 2 (two) times a day      metFORMIN (GLUCOPHAGE) 500 mg tablet Take 500 mg by mouth 2 (two) times a day with meals      metoprolol tartrate (LOPRESSOR) 25 mg tablet Take 1 tablet (25 mg total) by mouth every 12 (twelve) hours 180 tablet 0    NOVOLIN 70/30 RELION (70-30) 100 UNIT/ML subcutaneous injection       Omega-3 Fatty Acids (FISH OIL) 1,000 mg Take 1 capsule by mouth every 12 (twelve) hours      ramipril (ALTACE) 2 5 mg capsule Take 1 capsule (2 5 mg total) by mouth 2 (two) times a day 180 capsule 0    simvastatin (ZOCOR) 40 mg tablet Take 40 mg by mouth daily at bedtime      neomycin-polymyxin-dexamethasone (MAXITROL) ophthalmic suspension        No current facility-administered medications for this visit  Current Outpatient Prescriptions on File Prior to Visit   Medication Sig    aspirin 325 mg tablet Take 325 mg by mouth daily    glipiZIDE (GLUCOTROL) 10 mg tablet Take 10 mg by mouth 2 (two) times a day before meals    insulin NPH (HUMULIN N) 100 Units/mL subcutaneous injection Inject under the skin 26 units in am and 28 units pm     Lancets (FREESTYLE) lancets by Does not apply route 2 (two) times a day    metFORMIN (GLUCOPHAGE) 500 mg tablet Take 500 mg by mouth 2 (two) times a day with meals    metoprolol tartrate (LOPRESSOR) 25 mg tablet Take 1 tablet (25 mg total) by mouth every 12 (twelve) hours    Omega-3 Fatty Acids (FISH OIL) 1,000 mg Take 1 capsule by mouth every 12 (twelve) hours    ramipril (ALTACE) 2 5 mg capsule Take 1 capsule (2 5 mg total) by mouth 2 (two) times a day    simvastatin (ZOCOR) 40 mg tablet Take 40 mg by mouth daily at bedtime     No current facility-administered medications on file prior to visit  He is allergic to allegra allergy [fexofenadine]; coconut oil; fexofenadine-pseudoephed er; and food       Review of Systems   Constitutional: Negative    Negative for fever    HENT: Positive for congestion and rhinorrhea  Negative for sore throat  Respiratory: Positive for cough  Cardiovascular: Negative  Neurological: Positive for headaches  Objective:      /70 (BP Location: Right arm, Patient Position: Sitting, Cuff Size: Standard)   Pulse 68   Temp 98 1 °F (36 7 °C)   Resp 16   Ht 5' 9" (1 753 m)   Wt 90 3 kg (199 lb)   SpO2 96%   BMI 29 39 kg/m²          Physical Exam   Constitutional: He appears well-developed and well-nourished  HENT:   Head: Normocephalic and atraumatic  Right Ear: External ear normal    Left Ear: External ear normal    Nose: Nose normal    Mouth/Throat: Oropharynx is clear and moist    Neck: Normal range of motion  Neck supple  Cardiovascular: Normal rate, regular rhythm and normal heart sounds  Pulmonary/Chest: Effort normal and breath sounds normal    Abdominal: Soft   Bowel sounds are normal

## 2018-11-27 DIAGNOSIS — E78.5 DYSLIPIDEMIA: Primary | ICD-10-CM

## 2018-11-27 RX ORDER — SIMVASTATIN 40 MG
TABLET ORAL
Qty: 90 TABLET | Refills: 0 | Status: SHIPPED | OUTPATIENT
Start: 2018-11-27

## 2019-05-31 ENCOUNTER — TRANSCRIBE ORDERS (OUTPATIENT)
Dept: ADMINISTRATIVE | Facility: HOSPITAL | Age: 59
End: 2019-05-31

## 2019-05-31 DIAGNOSIS — M54.50 CHRONIC LOW BACK PAIN WITHOUT SCIATICA, UNSPECIFIED BACK PAIN LATERALITY: Primary | ICD-10-CM

## 2019-05-31 DIAGNOSIS — G89.29 CHRONIC LOW BACK PAIN WITHOUT SCIATICA, UNSPECIFIED BACK PAIN LATERALITY: Primary | ICD-10-CM

## 2019-06-10 ENCOUNTER — HOSPITAL ENCOUNTER (OUTPATIENT)
Dept: RADIOLOGY | Facility: IMAGING CENTER | Age: 59
Discharge: HOME | End: 2019-06-10
Payer: COMMERCIAL

## 2019-06-10 DIAGNOSIS — G89.29 CHRONIC LOW BACK PAIN WITHOUT SCIATICA, UNSPECIFIED BACK PAIN LATERALITY: ICD-10-CM

## 2019-06-10 DIAGNOSIS — M54.50 CHRONIC LOW BACK PAIN WITHOUT SCIATICA, UNSPECIFIED BACK PAIN LATERALITY: ICD-10-CM

## 2019-06-10 PROCEDURE — 72148 MRI LUMBAR SPINE W/O DYE: CPT

## 2019-12-28 ENCOUNTER — HOSPITAL ENCOUNTER (EMERGENCY)
Facility: HOSPITAL | Age: 59
Discharge: HOME | End: 2019-12-28
Attending: EMERGENCY MEDICINE | Admitting: EMERGENCY MEDICINE
Payer: COMMERCIAL

## 2019-12-28 ENCOUNTER — APPOINTMENT (EMERGENCY)
Dept: RADIOLOGY | Facility: HOSPITAL | Age: 59
End: 2019-12-28
Payer: COMMERCIAL

## 2019-12-28 VITALS
WEIGHT: 204 LBS | SYSTOLIC BLOOD PRESSURE: 129 MMHG | HEART RATE: 77 BPM | RESPIRATION RATE: 18 BRPM | OXYGEN SATURATION: 98 % | DIASTOLIC BLOOD PRESSURE: 67 MMHG | BODY MASS INDEX: 30.13 KG/M2 | TEMPERATURE: 99.1 F

## 2019-12-28 DIAGNOSIS — M79.605 LUMBAR PAIN WITH RADIATION DOWN LEFT LEG: Primary | ICD-10-CM

## 2019-12-28 DIAGNOSIS — M54.50 LUMBAR PAIN WITH RADIATION DOWN LEFT LEG: Primary | ICD-10-CM

## 2019-12-28 PROCEDURE — 72100 X-RAY EXAM L-S SPINE 2/3 VWS: CPT

## 2019-12-28 PROCEDURE — 99284 EMERGENCY DEPT VISIT MOD MDM: CPT | Performed by: PHYSICIAN ASSISTANT

## 2019-12-28 PROCEDURE — 96372 THER/PROPH/DIAG INJ SC/IM: CPT

## 2019-12-28 PROCEDURE — 99283 EMERGENCY DEPT VISIT LOW MDM: CPT

## 2019-12-28 RX ORDER — IBUPROFEN 400 MG/1
400 TABLET ORAL EVERY 6 HOURS PRN
Qty: 12 TABLET | Refills: 0 | Status: SHIPPED | OUTPATIENT
Start: 2019-12-28 | End: 2019-12-31

## 2019-12-28 RX ORDER — SENNOSIDES 8.6 MG
650 CAPSULE ORAL EVERY 8 HOURS PRN
Qty: 9 TABLET | Refills: 0 | Status: SHIPPED | OUTPATIENT
Start: 2019-12-28 | End: 2019-12-31

## 2019-12-28 RX ORDER — METHOCARBAMOL 500 MG/1
500 TABLET, FILM COATED ORAL 4 TIMES DAILY
Qty: 20 TABLET | Refills: 0 | Status: SHIPPED | OUTPATIENT
Start: 2019-12-28 | End: 2020-01-02

## 2019-12-28 RX ORDER — METHOCARBAMOL 500 MG/1
750 TABLET, FILM COATED ORAL ONCE
Status: COMPLETED | OUTPATIENT
Start: 2019-12-28 | End: 2019-12-28

## 2019-12-28 RX ORDER — LIDOCAINE 50 MG/G
1 PATCH TOPICAL ONCE
Status: DISCONTINUED | OUTPATIENT
Start: 2019-12-28 | End: 2019-12-28 | Stop reason: HOSPADM

## 2019-12-28 RX ORDER — ACETAMINOPHEN 325 MG/1
650 TABLET ORAL ONCE
Status: COMPLETED | OUTPATIENT
Start: 2019-12-28 | End: 2019-12-28

## 2019-12-28 RX ORDER — OXYCODONE HYDROCHLORIDE AND ACETAMINOPHEN 5; 325 MG/1; MG/1
1 TABLET ORAL ONCE
Status: COMPLETED | OUTPATIENT
Start: 2019-12-28 | End: 2019-12-28

## 2019-12-28 RX ORDER — KETOROLAC TROMETHAMINE 30 MG/ML
15 INJECTION, SOLUTION INTRAMUSCULAR; INTRAVENOUS ONCE
Status: COMPLETED | OUTPATIENT
Start: 2019-12-28 | End: 2019-12-28

## 2019-12-28 RX ADMIN — ACETAMINOPHEN 650 MG: 325 TABLET, FILM COATED ORAL at 04:32

## 2019-12-28 RX ADMIN — KETOROLAC TROMETHAMINE 15 MG: 30 INJECTION, SOLUTION INTRAMUSCULAR at 04:32

## 2019-12-28 RX ADMIN — METHOCARBAMOL TABLETS 750 MG: 500 TABLET, COATED ORAL at 04:32

## 2019-12-28 RX ADMIN — OXYCODONE HYDROCHLORIDE AND ACETAMINOPHEN 1 TABLET: 5; 325 TABLET ORAL at 05:56

## 2019-12-28 RX ADMIN — LIDOCAINE 1 PATCH: 50 PATCH TOPICAL at 04:32
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
## 2019-12-30 ENCOUNTER — TELEPHONE (OUTPATIENT)
Dept: PHYSICAL THERAPY | Facility: OTHER | Age: 59
End: 2019-12-30
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
## 2021-02-17 ENCOUNTER — TELEPHONE (OUTPATIENT)
Dept: GASTROENTEROLOGY | Facility: CLINIC | Age: 61
End: 2021-02-17

## 2021-02-17 NOTE — TELEPHONE ENCOUNTER
Received fax from [facility] to call and schedule patient for diarrhea  Will call to schedule new patient appt with Dr Sánchez Delvalle

## 2021-02-17 NOTE — TELEPHONE ENCOUNTER
Called and left message for patient to call and schedule an appointment  Will call again in 1 wk if he doesn't return call to schedule

## 2021-02-24 NOTE — TELEPHONE ENCOUNTER
Called and left message for patient to call and schedule  Will call him one more time in 2 wks if he doesn't return call
